# Patient Record
Sex: FEMALE | Race: ASIAN | NOT HISPANIC OR LATINO
[De-identification: names, ages, dates, MRNs, and addresses within clinical notes are randomized per-mention and may not be internally consistent; named-entity substitution may affect disease eponyms.]

---

## 2023-04-07 ENCOUNTER — NON-APPOINTMENT (OUTPATIENT)
Age: 48
End: 2023-04-07

## 2023-08-18 PROBLEM — Z98.890 S/P BREAST BIOPSY, RIGHT: Status: ACTIVE | Noted: 2023-08-18

## 2023-08-22 ENCOUNTER — APPOINTMENT (OUTPATIENT)
Dept: BREAST CENTER | Facility: CLINIC | Age: 48
End: 2023-08-22
Payer: COMMERCIAL

## 2023-08-22 VITALS
DIASTOLIC BLOOD PRESSURE: 77 MMHG | WEIGHT: 118 LBS | HEART RATE: 77 BPM | SYSTOLIC BLOOD PRESSURE: 110 MMHG | BODY MASS INDEX: 17.68 KG/M2 | HEIGHT: 68.5 IN

## 2023-08-22 DIAGNOSIS — R92.8 OTHER ABNORMAL AND INCONCLUSIVE FINDINGS ON DIAGNOSTIC IMAGING OF BREAST: ICD-10-CM

## 2023-08-22 DIAGNOSIS — Z98.890 OTHER SPECIFIED POSTPROCEDURAL STATES: ICD-10-CM

## 2023-08-22 PROCEDURE — 99214 OFFICE O/P EST MOD 30 MIN: CPT

## 2023-08-25 NOTE — PHYSICAL EXAM
[Supple] : supple [No Supraclavicular Adenopathy] : no supraclavicular adenopathy [Examined in the supine and seated position] : examined in the supine and seated position [No Nipple Retraction] : no left nipple retraction [No Nipple Discharge] : no left nipple discharge [No Axillary Lymphadenopathy] : no left axillary lymphadenopathy [de-identified] : growth on R nipple

## 2023-08-25 NOTE — PAST MEDICAL HISTORY
[Menarche Age ____] : age at menarche was [unfilled] [History of Hormone Replacement Treatment] : has a history of hormone replacement treatment [Definite ___ (Date)] : the last menstrual period was [unfilled] [Irregular Cycle Intervals] : are  irregular [Total Preg ___] : G[unfilled] [FreeTextEntry6] : yes, freezing eggs 10 cycles.  [FreeTextEntry7] : no [FreeTextEntry8] : n/a

## 2023-08-25 NOTE — HISTORY OF PRESENT ILLNESS
[FreeTextEntry1] : 48 year old female, last seen by Dr. Dang 2/13/2018, who presents for initial evaluation s/p benign, concordant US-guided core biopsy on 7/24/23 of  a previous identified 8 mm lobulated solid mass at the 9:00 retroareolar location of the right breast seen on screening US, pathology revealed Fragments of a thick-walled cystic lesion with an attenuated epithelial lining, pericystic inflammatory infiltrate, clusters of foamy histiocytes and reactive stromal change suggestive of cystic rupture/leakage. Also seen on annual breast imaging 6/29/23 were multiple bilateral probably benign nodules, with recommendation for six-month follow-up targeted bilateral ultrasound. Denies prior breast surgeries or biopsies. Patient denies palpable masses, nipple discharge, skin changes.  Patient was previously followed by Dr. Dang for left breast asymmetry and complex cystic right breast.   Patient denies family history of breast or ovarian cancer.  Annamaria Lifetime Risk 12%

## 2023-08-25 NOTE — ASSESSMENT
[FreeTextEntry1] : 48 year old female, last seen by Dr. Dang 2/13/2018, who presents for initial evaluation s/p benign, concordant US-guided core biopsy on 7/24/23 of  a previous identified 8 mm lobulated solid mass at the 9:00 retroareolar location of the right breast seen on screening US, pathology revealed Fragments of a thick-walled cystic lesion with an attenuated epithelial lining, pericystic inflammatory infiltrate, clusters of foamy histiocytes and reactive stromal change suggestive of cystic rupture/leakage. Also seen on annual breast imaging 6/29/23 were multiple bilateral probably benign nodules.  ARAM 12%. Physical exam WNL, growth noted on R nipple, offered in-office excision if bothersome to patient; patient will consider. Plan for targeted bilateral ultrasound and re-examination with PA in Jan 2024, then annual breast imaging and breast exam with Dr. Smiley in 1 year. Patient verbalizes understanding and is in agreement with the plan.

## 2023-08-25 NOTE — DATA REVIEWED
[FreeTextEntry1] : 6/29/23 (R) B/L sMMG/US: heterogeneously dense.  -There is a 1 x 0.3 x 0.9 cm oval circumscribed hypoechoic nodule with parallel orientation in the 9:00 position of the right breast, 3cmfn along the chest wall. Six-month follow-up targeted ultrasound is recommended. -There is a 0.8 x 0.5 x 0.7 cm microlobulated hypoechoic nodule with indistinct margins in the retroareolar right breast, 9:00 position. -There is a 1.1 x 0.3 x 1 cm simple cyst in the retroareolar right breast, 6:00 position. -There is a 0.4 x 0.2 x 0.4 cm circumscribed hypoechoic nodule or cyst with debris in the 2:00 position of the left breast, 3cmfn. Six-month follow-up targeted ultrasound is recommended. -There is a 0.3 x 0.3 x 0.3 cm circumscribed hypoechoic nodule or cyst with debris in the 5:00 position of the left breast, 1cmfn. Six-month follow-up targeted ultrasound is recommended. -There is a 0.2 x 0.2 x 0.3 cm simple cyst in the retroareolar right breast, 9:00 position. IMPRESSION: 1.Indeterminate nodule in the retroareolar right breast, 9:00 position. Ultrasound-guided biopsy is recommended. 2. Bilateral probably benign nodules. Six-month follow-up targeted bilateral ultrasound is recommended. FOLLOW-UP: R ultrasound guided biopsy. BI-RADS 4:  Suspicious.   7/11/23 ([facility] Path) US-guided core biopsy of  a previous identified 8 mm lobulated solid mass at the 9:00 retroareolar location of the right breast (ribbon clip): Fragments of a thick-walled cystic lesion with an attenuated epithelial lining, pericystic inflammatory infiltrate, clusters of foamy histiocytes and reactive stromal change suggestive of cystic rupture/leakage. Benign, concordant.

## 2023-12-29 NOTE — PHYSICAL EXAM
[Supple] : supple [No Supraclavicular Adenopathy] : no supraclavicular adenopathy [Examined in the supine and seated position] : examined in the supine and seated position [No Nipple Retraction] : no left nipple retraction [No Nipple Discharge] : no left nipple discharge [No Axillary Lymphadenopathy] : no left axillary lymphadenopathy [de-identified] : growth on R nipple

## 2023-12-29 NOTE — HISTORY OF PRESENT ILLNESS
[FreeTextEntry1] : 48 year old female who presents for 4 month follow up. She is s/p benign, concordant US-guided core biopsy on 7/24/23 of  a previous identified 8 mm lobulated solid mass at the 9:00 retroareolar location of the right breast seen on screening US, pathology revealed Fragments of a thick-walled cystic lesion with an attenuated epithelial lining, pericystic inflammatory infiltrate, clusters of foamy histiocytes and reactive stromal change suggestive of cystic rupture/leakage. Also seen on annual breast imaging 6/29/23 were multiple bilateral probably benign nodules, with recommendation for six-month follow-up targeted bilateral ultrasound. This B/L US was completed on 12/26/23 revealing stable probably benign nodules bilaterally, BI-RADS 3, with annual bilateral screening mammo/sono due June 2024. Patient denies palpable masses, nipple discharge, skin changes.  Denies prior breast surgeries. Patient was previously followed by Dr. Dang for left breast asymmetry and complex cystic right breast.   Patient denies family history of breast or ovarian cancer.  Annamaria Lifetime Risk 12%

## 2023-12-29 NOTE — DATA REVIEWED
[FreeTextEntry1] : 6/29/23 (R) B/L sMMG/US: heterogeneously dense.  -There is a 1 x 0.3 x 0.9 cm oval circumscribed hypoechoic nodule with parallel orientation in the 9:00 position of the right breast, 3cmfn along the chest wall. Six-month follow-up targeted ultrasound is recommended. -There is a 0.8 x 0.5 x 0.7 cm microlobulated hypoechoic nodule with indistinct margins in the retroareolar right breast, 9:00 position. -There is a 1.1 x 0.3 x 1 cm simple cyst in the retroareolar right breast, 6:00 position. -There is a 0.4 x 0.2 x 0.4 cm circumscribed hypoechoic nodule or cyst with debris in the 2:00 position of the left breast, 3cmfn. Six-month follow-up targeted ultrasound is recommended. -There is a 0.3 x 0.3 x 0.3 cm circumscribed hypoechoic nodule or cyst with debris in the 5:00 position of the left breast, 1cmfn. Six-month follow-up targeted ultrasound is recommended. -There is a 0.2 x 0.2 x 0.3 cm simple cyst in the retroareolar right breast, 9:00 position. IMPRESSION: 1.Indeterminate nodule in the retroareolar right breast, 9:00 position. Ultrasound-guided biopsy is recommended. 2. Bilateral probably benign nodules. Six-month follow-up targeted bilateral ultrasound is recommended. FOLLOW-UP: R ultrasound guided biopsy. BI-RADS 4:  Suspicious.   7/11/23 ([facility] Path) US-guided core biopsy of  a previous identified 8 mm lobulated solid mass at the 9:00 retroareolar location of the right breast (ribbon clip): Fragments of a thick-walled cystic lesion with an attenuated epithelial lining, pericystic inflammatory infiltrate, clusters of foamy histiocytes and reactive stromal change suggestive of cystic rupture/leakage. Benign, concordant.   12/26/23: B/L US (R)- heterogeneously dense. R 9:00-N3: 9 mm hypoechoic mass, stable. R 9:00-retroareolar: 3 mm mass, residual of biopsy proven benign cyst. L 2:00-N3: 4 mm cyst vertical orientation, continued follow-up. L 5:00-N 1: 3 mm cyst, stable. BI-RADS 3: Probably benign. FOLLOW UP: annual bilateral screening mammogram and sonogram due June 2024

## 2023-12-29 NOTE — ASSESSMENT
[FreeTextEntry1] : 48 year old female who presents for 4 month follow up. She is s/p benign, concordant US-guided core biopsy on 7/24/23 of a previous identified 8 mm lobulated solid mass at the 9:00 retroareolar location of the right breast seen on screening US, pathology revealed Fragments of a thick-walled cystic lesion with an attenuated epithelial lining, pericystic inflammatory infiltrate, clusters of foamy histiocytes and reactive stromal change suggestive of cystic rupture/leakage. Also seen on annual breast imaging 6/29/23 were multiple bilateral probably benign nodules.  ARAM 12%. Physical exam WNL, growth noted on R nipple, offered in-office excision if bothersome to patient; patient will consider. Most recent imaging reviewed: B/L US (12/26/23), BI-RADS 3, with stable probably benign cysts bilaterally. Plan for targeted bilateral sMMG/US and re-examination in June 2024.Patient verbalizes understanding and is in agreement with the plan.

## 2024-01-29 ENCOUNTER — NON-APPOINTMENT (OUTPATIENT)
Age: 49
End: 2024-01-29

## 2024-01-30 ENCOUNTER — APPOINTMENT (OUTPATIENT)
Dept: BREAST CENTER | Facility: CLINIC | Age: 49
End: 2024-01-30

## 2024-01-31 ENCOUNTER — NON-APPOINTMENT (OUTPATIENT)
Age: 49
End: 2024-01-31

## 2024-02-02 ENCOUNTER — NON-APPOINTMENT (OUTPATIENT)
Age: 49
End: 2024-02-02